# Patient Record
Sex: FEMALE | Race: WHITE | Employment: OTHER | URBAN - METROPOLITAN AREA
[De-identification: names, ages, dates, MRNs, and addresses within clinical notes are randomized per-mention and may not be internally consistent; named-entity substitution may affect disease eponyms.]

---

## 2018-02-22 ENCOUNTER — HOSPITAL ENCOUNTER (EMERGENCY)
Age: 68
Discharge: HOME | End: 2018-02-22
Attending: EMERGENCY MEDICINE
Payer: MEDICARE

## 2018-02-22 VITALS
TEMPERATURE: 97.5 F | DIASTOLIC BLOOD PRESSURE: 87 MMHG | BODY MASS INDEX: 23.32 KG/M2 | HEIGHT: 65 IN | WEIGHT: 140 LBS | HEART RATE: 73 BPM | RESPIRATION RATE: 16 BRPM | SYSTOLIC BLOOD PRESSURE: 151 MMHG | OXYGEN SATURATION: 97 %

## 2018-02-22 DIAGNOSIS — E86.0 DEHYDRATION: Primary | ICD-10-CM

## 2018-02-22 DIAGNOSIS — J11.1 FLU: ICD-10-CM

## 2018-02-22 LAB
ALBUMIN SERPL-MCNC: 4.3 G/DL (ref 3.9–4.9)
ALP BLD-CCNC: 167 U/L (ref 40–130)
ALT SERPL-CCNC: 32 U/L (ref 0–33)
AMYLASE: 37 U/L (ref 28–100)
ANION GAP SERPL CALCULATED.3IONS-SCNC: 21 MEQ/L (ref 7–13)
AST SERPL-CCNC: 50 U/L (ref 0–35)
BASOPHILS ABSOLUTE: 0 K/UL (ref 0–0.2)
BASOPHILS RELATIVE PERCENT: 0.8 %
BILIRUB SERPL-MCNC: 0.2 MG/DL (ref 0–1.2)
BUN BLDV-MCNC: 10 MG/DL (ref 8–23)
CALCIUM SERPL-MCNC: 9.3 MG/DL (ref 8.6–10.2)
CHLORIDE BLD-SCNC: 97 MEQ/L (ref 98–107)
CO2: 21 MEQ/L (ref 22–29)
CREAT SERPL-MCNC: 0.46 MG/DL (ref 0.5–0.9)
EOSINOPHILS ABSOLUTE: 0 K/UL (ref 0–0.7)
EOSINOPHILS RELATIVE PERCENT: 0.2 %
GFR AFRICAN AMERICAN: >60
GFR NON-AFRICAN AMERICAN: >60
GLOBULIN: 2.8 G/DL (ref 2.3–3.5)
GLUCOSE BLD-MCNC: 155 MG/DL (ref 74–109)
HCT VFR BLD CALC: 41.2 % (ref 37–47)
HEMOGLOBIN: 14.2 G/DL (ref 12–16)
LIPASE: 16 U/L (ref 13–60)
LYMPHOCYTES ABSOLUTE: 0.5 K/UL (ref 1–4.8)
LYMPHOCYTES RELATIVE PERCENT: 19.1 %
MCH RBC QN AUTO: 33.7 PG (ref 27–31.3)
MCHC RBC AUTO-ENTMCNC: 34.4 % (ref 33–37)
MCV RBC AUTO: 98.1 FL (ref 82–100)
MONOCYTES ABSOLUTE: 0.3 K/UL (ref 0.2–0.8)
MONOCYTES RELATIVE PERCENT: 9.5 %
NEUTROPHILS ABSOLUTE: 2 K/UL (ref 1.4–6.5)
NEUTROPHILS RELATIVE PERCENT: 70.4 %
PDW BLD-RTO: 12.7 % (ref 11.5–14.5)
PLATELET # BLD: 175 K/UL (ref 130–400)
POTASSIUM SERPL-SCNC: 3.8 MEQ/L (ref 3.5–5.1)
RAPID INFLUENZA  B AGN: POSITIVE
RAPID INFLUENZA A AGN: NEGATIVE
RBC # BLD: 4.2 M/UL (ref 4.2–5.4)
SODIUM BLD-SCNC: 139 MEQ/L (ref 132–144)
TOTAL PROTEIN: 7.1 G/DL (ref 6.4–8.1)
WBC # BLD: 2.9 K/UL (ref 4.8–10.8)

## 2018-02-22 PROCEDURE — 96374 THER/PROPH/DIAG INJ IV PUSH: CPT

## 2018-02-22 PROCEDURE — 99283 EMERGENCY DEPT VISIT LOW MDM: CPT

## 2018-02-22 PROCEDURE — 80053 COMPREHEN METABOLIC PANEL: CPT

## 2018-02-22 PROCEDURE — 6360000002 HC RX W HCPCS: Performed by: EMERGENCY MEDICINE

## 2018-02-22 PROCEDURE — 86403 PARTICLE AGGLUT ANTBDY SCRN: CPT

## 2018-02-22 PROCEDURE — 85025 COMPLETE CBC W/AUTO DIFF WBC: CPT

## 2018-02-22 PROCEDURE — 83690 ASSAY OF LIPASE: CPT

## 2018-02-22 PROCEDURE — 96361 HYDRATE IV INFUSION ADD-ON: CPT

## 2018-02-22 PROCEDURE — 2580000003 HC RX 258: Performed by: EMERGENCY MEDICINE

## 2018-02-22 PROCEDURE — 82150 ASSAY OF AMYLASE: CPT

## 2018-02-22 PROCEDURE — 36415 COLL VENOUS BLD VENIPUNCTURE: CPT

## 2018-02-22 RX ORDER — DIPHENHYDRAMINE HYDROCHLORIDE 50 MG/ML
25 INJECTION INTRAMUSCULAR; INTRAVENOUS ONCE
Status: COMPLETED | OUTPATIENT
Start: 2018-02-22 | End: 2018-02-22

## 2018-02-22 RX ORDER — SODIUM CHLORIDE 0.9 % (FLUSH) 0.9 %
3 SYRINGE (ML) INJECTION EVERY 8 HOURS
Status: DISCONTINUED | OUTPATIENT
Start: 2018-02-22 | End: 2018-02-22 | Stop reason: HOSPADM

## 2018-02-22 RX ORDER — METOPROLOL TARTRATE 50 MG/1
50 TABLET, FILM COATED ORAL 2 TIMES DAILY
COMMUNITY

## 2018-02-22 RX ORDER — 0.9 % SODIUM CHLORIDE 0.9 %
1000 INTRAVENOUS SOLUTION INTRAVENOUS ONCE
Status: COMPLETED | OUTPATIENT
Start: 2018-02-22 | End: 2018-02-22

## 2018-02-22 RX ORDER — METOCLOPRAMIDE HYDROCHLORIDE 5 MG/ML
10 INJECTION INTRAMUSCULAR; INTRAVENOUS ONCE
Status: COMPLETED | OUTPATIENT
Start: 2018-02-22 | End: 2018-02-22

## 2018-02-22 RX ADMIN — SODIUM CHLORIDE 1000 ML: 9 INJECTION, SOLUTION INTRAVENOUS at 00:46

## 2018-02-22 RX ADMIN — DIPHENHYDRAMINE HYDROCHLORIDE 25 MG: 50 INJECTION INTRAMUSCULAR; INTRAVENOUS at 00:47

## 2018-02-22 RX ADMIN — METOCLOPRAMIDE 10 MG: 5 INJECTION, SOLUTION INTRAMUSCULAR; INTRAVENOUS at 00:47

## 2018-02-22 RX ADMIN — Medication 3 ML: at 00:49

## 2018-02-22 ASSESSMENT — ENCOUNTER SYMPTOMS
CONSTIPATION: 0
CHEST TIGHTNESS: 0
NAUSEA: 1
DIARRHEA: 0
SHORTNESS OF BREATH: 0
WHEEZING: 0
BLOOD IN STOOL: 0
BACK PAIN: 0
SINUS PRESSURE: 0
TROUBLE SWALLOWING: 0
VOICE CHANGE: 0
EYE PAIN: 0
COUGH: 0
FACIAL SWELLING: 0
ABDOMINAL PAIN: 0
STRIDOR: 0
CHOKING: 0
EYE DISCHARGE: 0
SORE THROAT: 0
VOMITING: 1
EYE REDNESS: 0

## 2018-02-22 NOTE — ED PROVIDER NOTES
and neck stiffness. Skin: Negative for pallor and rash. Neurological: Positive for weakness. Negative for tremors, seizures, syncope, numbness and headaches. Hematological: Negative for adenopathy. Does not bruise/bleed easily. Psychiatric/Behavioral: Negative for agitation, behavioral problems, hallucinations and sleep disturbance. The patient is not hyperactive. All other systems reviewed and are negative. Except as noted above the remainder of the review of systems was reviewed and negative. PAST MEDICAL HISTORY     Past Medical History:   Diagnosis Date    Breast cancer ([address]     Hypertension          SURGICAL HISTORY       Past Surgical History:   Procedure Laterality Date    CHOLECYSTECTOMY      GASTRECTOMY      MASTECTOMY Bilateral          CURRENT MEDICATIONS       Previous Medications    METOPROLOL TARTRATE (LOPRESSOR) 50 MG TABLET    Take 50 mg by mouth 2 times daily       ALLERGIES     Hctz [hydrochlorothiazide]; Iv dye [iodides]; Naproxen; Pcn [penicillins]; Prednisone; and Sulfa antibiotics    FAMILY HISTORY     History reviewed. No pertinent family history. SOCIAL HISTORY       Social History     Social History    Marital status: Single     Spouse name: N/A    Number of children: N/A    Years of education: N/A     Social History Main Topics    Smoking status: Never Smoker    Smokeless tobacco: Never Used    Alcohol use No    Drug use: No    Sexual activity: Not Asked     Other Topics Concern    None     Social History Narrative    None       SCREENINGS             PHYSICAL EXAM    (up to 7 for level 4, 8 or more for level 5)     ED Triage Vitals [02/22/18 0006]   BP Temp Temp Source Pulse Resp SpO2 Height Weight   (!) 187/114 -- Oral 74 18 98 % -- --       Physical Exam   Constitutional: She is oriented to person, place, and time. She appears well-developed and well-nourished. Tablet in vitro   HENT:   Head: Normocephalic and atraumatic.    Eyes: EOM are

## 2022-06-03 ENCOUNTER — TELEPHONE (OUTPATIENT)
Dept: PHARMACY | Facility: CLINIC | Age: 72
End: 2022-06-03

## 2022-06-03 NOTE — TELEPHONE ENCOUNTER
[facility] HEALTH CLINICAL PHARMACY: STATIN THERAPY REVIEW  Identified statin use in persons with diabetes care gap per [employer]. Records dated: 5/15/22. Last Office Visit: 4/13/22 with cardiology, 11/1/21 with Family Medicine  - Note: appears to follow with [facility] (not [facility] providers ([employer] portal lists Sandra Burrows - per this EMR, appears has followed with [facility] family medicine since at least 2017?)    Patient not found in Outcomes MTM    ASSESSMENT  DIABETES ADHERENCE  *NOTE: Matthews Monk rx for HF, NOT DM    Insurance Records claims through 5/15/22 (YTD Jean Prieto = 98%; Potential Fail Date: 9/23/22):   Jardiance 10mg last filled on 4/20/22 for 90 day supply. Next refill due: 7/19/22    Per CareEverywhere summary, last ordered 10/18/21 for #90, 3 refills (should have refill remaining)    No results found for: LABA1C    STATIN GAP IDENTIFIED    Per chart review, patient is prescribed Jardiance for HF, no DM dx    Per Care Everywhere:  - 7/20/21 Lipid Panel         HDL 45     - 3/18/22 AST/ALT 22/14  - Glucose   4/28/22 94   3/18/22 176   1/13/22 100   12/17/21 120   12/4/21 120   11/19/21 119    The 10-year ASCVD Risk score: 13.6% (intermediate risk)  Sex: Female  Race: White  Values Current  Age: 74  Total Cholesterol (mg/dL) 212  HDL Cholesterol (mg/dL) 45  LDL Cholesterol (mg/dL) 392  Systolic Blood Pressure (mm Hg) 600  Diastolic Blood Pressure (mm Hg) 66  Diabetes: No  Smoker: Never  Treatment for Hypertension: Yes    PLAN  Will not outreach at this time - per 4/13/22 cardiology visit note: \"Mild CAD noted on recent left heart catheterization. We will discuss other cardiac medications including: Statin medication. Lipids should be monitored yearly. Attempt to optimize above heart failure therapy prior to adding additional medications. ... Currently not on statin medication. Goal LDL is less than 70 mg/dL. We will discuss starting a statin medication at next visit. \"    Next cardiology appt appears to be scheduled 10/17/22.     Aurora OneilD, [facility]  Department, toll free: [phone], option 1     =======================================================   For Pharmacy Admin Tracking Only     Gap Closed?: No    Time Spent (min): 15

## 2022-10-17 ENCOUNTER — TELEPHONE (OUTPATIENT)
Dept: PHARMACY | Facility: CLINIC | Age: 72
End: 2022-10-17

## 2022-10-17 NOTE — TELEPHONE ENCOUNTER
[facility] CLINICAL PHARMACY: STATIN THERAPY REVIEW  Identified statin use in persons with cardiovascular disease care gap per [employer]. Records dated 10/8/22. ASSESSMENT:  Patient has been identified as having a diagnosis for clinical ASCVD or event (e.g., inpatient hospitalization for MI, CABG, PCI or other revascularization procedures) in the measurement year and not currently filling a moderate or high intensity statin. Patients included in this care gap are males age 18-72 and females age 43-69. Per chart review, per 4/13/22 cardiology visit note: \"Mild CAD noted on recent left heart catheterization. We will discuss other cardiac medications including: Statin medication. Lipids should be monitored yearly. Attempt to optimize above heart failure therapy prior to adding additional medications. ... Currently not on statin medication. Goal LDL is less than 70 mg/dL. We will discuss starting a statin medication at next visit. \"     Next cardiology appt appears to be scheduled 10/17/22. No results found for: CHOL, TRIG, HDL, LDLCHOLESTEROL, LDLCALC, LDLDIRECT  ALT   Date Value Ref Range Status   02/22/2018 32 0 - 33 U/L Final     AST   Date Value Ref Range Status   02/22/2018 50 (H) 0 - 35 U/L Final       The ASCVD Risk score (Melcher Dallas DK, et al., 2019) failed to calculate for the following reasons: The systolic blood pressure is missing    Cannot find a previous HDL lab    Cannot find a previous total cholesterol lab     PLAN:  Will fax [address] note to cardiology. Orlin Sinha MD  Cardiology  Fax: [phone]    No future appointments. Deisy Valles, AuroraD, [facility] [place]   Department, toll free: [phone], option 1    For Pharmacy Admin Tracking Only  Note does not appear statin started at 10/17/22 visit.   CPA in place:  No  Recommendation Provided To: Provider: 1 via Fax sent to office  Intervention Detail: New Rx: 1, reason: Needs Additional Therapy  Gap Closed?: No   Intervention Accepted By: Provider: 0  Time Spent (min): 15

## 2022-10-17 NOTE — LETTER
Using a Statin for Your Patient with Cardiovascular Disease    Date: 10/17/22    Dear Dr. Brittney Wellington MD  Fax: [phone]    Concerning patient: Enid Clemente  :  1950    It is strongly recommended that your patient with cardiovascular disease be on a moderate or high-intensity statin. If appropriate, please consider adding a statin medication to your patient's therapy. Statin therapy for secondary prevention in patients with clinical arthrosclerotic cardiovascular disease (ASCVD) is recommended by the [address] [place] [address] St of Cardiology and American Heart Association (ACC/AHA). These guidelines are based on numerous trials that show moderate to high-intensity statins reduce additional ASCVD events and disease progression. The Centers for Medicare & Medicaid Services (CMS) also supports this recommendation. Their \"Statin Use in Persons with Cardiovascular Disease\" measure as part of the overall STAR rating. Our team of clinical pharmacists is working with the patient's health plan to assess the gap in therapy. ACC/AHA guidelines call for a high-intensity statin for patients with clinical ASCVD. Moderate-intensity statin therapy may be considered if the patient has drug-drug interactions or a history of statin intolerance. These recommendations are regardless of the baseline lipid levels. If you agree, send a prescription to your patients pharmacy. If you do not agree, please return response to fax number below for our records. Please also feel free to contact me at the number below with any questions or concerns, or if you would like me to further discuss with your patient. Thank you for your help and consideration in caring for this patient.     Respectfully,    [address]  Phone: [phone] // Fax: [phone]

## 2023-04-20 ENCOUNTER — HOSPITAL ENCOUNTER (EMERGENCY)
Age: 73
Discharge: HOME | End: 2023-04-20
Payer: OTHER GOVERNMENT

## 2023-04-20 ENCOUNTER — APPOINTMENT (OUTPATIENT)
Dept: GENERAL RADIOLOGY | Age: 73
End: 2023-04-20
Payer: OTHER GOVERNMENT

## 2023-04-20 VITALS
BODY MASS INDEX: 20.83 KG/M2 | DIASTOLIC BLOOD PRESSURE: 65 MMHG | OXYGEN SATURATION: 99 % | SYSTOLIC BLOOD PRESSURE: 107 MMHG | RESPIRATION RATE: 18 BRPM | TEMPERATURE: 98.3 F | HEART RATE: 80 BPM | WEIGHT: 125 LBS | HEIGHT: 65 IN

## 2023-04-20 DIAGNOSIS — S62.102B OPEN FRACTURE OF LEFT WRIST, INITIAL ENCOUNTER: ICD-10-CM
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
## 2023-04-20 PROCEDURE — 73110 X-RAY EXAM OF WRIST: CPT

## 2023-04-20 PROCEDURE — 73100 X-RAY EXAM OF WRIST: CPT

## 2023-04-20 PROCEDURE — 99285 EMERGENCY DEPT VISIT HI MDM: CPT

## 2023-04-20 PROCEDURE — 12002 RPR S/N/AX/GEN/TRNK2.6-7.5CM: CPT

## 2023-04-20 PROCEDURE — 6370000000 HC RX 637 (ALT 250 FOR IP): Performed by: PHYSICIAN ASSISTANT

## 2023-04-20 PROCEDURE — 73130 X-RAY EXAM OF HAND: CPT

## 2023-04-20 RX ORDER — BACITRACIN, NEOMYCIN, POLYMYXIN B 400; 3.5; 5 [USP'U]/G; MG/G; [USP'U]/G
OINTMENT TOPICAL ONCE
Status: COMPLETED | OUTPATIENT
Start: 2023-04-20 | End: 2023-04-20

## 2023-04-20 RX ORDER — CEFAZOLIN SODIUM IN 0.9 % NACL 2 G/100 ML
2000 PLASTIC BAG, INJECTION (ML) INTRAVENOUS ONCE
Status: DISCONTINUED | OUTPATIENT
Start: 2023-04-20 | End: 2023-04-20

## 2023-04-20 RX ORDER — HYDROCODONE BITARTRATE AND ACETAMINOPHEN 5; 325 MG/1; MG/1
1 TABLET ORAL EVERY 6 HOURS PRN
Qty: 12 TABLET | Refills: 0 | Status: SHIPPED | OUTPATIENT
Start: 2023-04-20 | End: 2023-04-23

## 2023-04-20 RX ORDER — CIPROFLOXACIN 500 MG/1
500 TABLET, FILM COATED ORAL ONCE
Status: COMPLETED | OUTPATIENT
Start: 2023-04-20 | End: 2023-04-20

## 2023-04-20 RX ORDER — CLINDAMYCIN HYDROCHLORIDE 300 MG/1
300 CAPSULE ORAL 2 TIMES DAILY
Qty: 14 CAPSULE | Refills: 0 | Status: SHIPPED | OUTPATIENT
Start: 2023-04-20 | End: 2023-04-27

## 2023-04-20 RX ORDER — CIPROFLOXACIN 500 MG/1
500 TABLET, FILM COATED ORAL 2 TIMES DAILY
Qty: 14 TABLET | Refills: 0 | Status: SHIPPED | OUTPATIENT
Start: 2023-04-20 | End: 2023-04-27

## 2023-04-20 RX ORDER — CLINDAMYCIN HYDROCHLORIDE 150 MG/1
300 CAPSULE ORAL ONCE
Status: COMPLETED | OUTPATIENT
Start: 2023-04-20 | End: 2023-04-20

## 2023-04-20 RX ADMIN — CLINDAMYCIN HYDROCHLORIDE 300 MG: 150 CAPSULE ORAL at 08:47

## 2023-04-20 RX ADMIN — CIPROFLOXACIN 500 MG: 500 TABLET, FILM COATED ORAL at 08:47

## 2023-04-20 RX ADMIN — BACITRACIN, NEOMYCIN, POLYMYXIN B: 400; 3.5; 5 OINTMENT TOPICAL at 08:48

## 2023-04-20 ASSESSMENT — LIFESTYLE VARIABLES
HOW MANY STANDARD DRINKS CONTAINING ALCOHOL DO YOU HAVE ON A TYPICAL DAY: PATIENT DOES NOT DRINK
HOW MANY STANDARD DRINKS CONTAINING ALCOHOL DO YOU HAVE ON A TYPICAL DAY: PATIENT DOES NOT DRINK
HOW OFTEN DO YOU HAVE A DRINK CONTAINING ALCOHOL: NEVER
HOW OFTEN DO YOU HAVE A DRINK CONTAINING ALCOHOL: NEVER

## 2023-04-20 ASSESSMENT — PAIN DESCRIPTION - ORIENTATION: ORIENTATION: LEFT

## 2023-04-20 ASSESSMENT — PAIN DESCRIPTION - ONSET: ONSET: ON-GOING

## 2023-04-20 ASSESSMENT — ENCOUNTER SYMPTOMS
EYE DISCHARGE: 0
SORE THROAT: 0
COLOR CHANGE: 0
ABDOMINAL DISTENTION: 0
RHINORRHEA: 0
CONSTIPATION: 0
ABDOMINAL PAIN: 0
SHORTNESS OF BREATH: 0

## 2023-04-20 ASSESSMENT — PAIN DESCRIPTION - FREQUENCY: FREQUENCY: INTERMITTENT

## 2023-04-20 ASSESSMENT — PAIN - FUNCTIONAL ASSESSMENT: PAIN_FUNCTIONAL_ASSESSMENT: 0-10

## 2023-04-20 ASSESSMENT — PAIN DESCRIPTION - PAIN TYPE: TYPE: ACUTE PAIN

## 2023-04-20 ASSESSMENT — PAIN SCALES - GENERAL: PAINLEVEL_OUTOF10: 8

## 2023-04-20 ASSESSMENT — PAIN DESCRIPTION - DESCRIPTORS: DESCRIPTORS: ACHING

## 2023-04-20 ASSESSMENT — PAIN DESCRIPTION - LOCATION: LOCATION: HAND

## 2023-04-20 NOTE — ED NOTES
Wound care completed.  Vaseline gauze, ATB ointment, nonadherent pads, and kerlex to secure      Keanu Reddy RN  04/20/23 [id number]

## 2023-04-20 NOTE — ED TRIAGE NOTES
Patient arrived from home via family after being bit by her pit bull. Patient A&OX4. Dog bite to left hand.  Wrapped in towel upon arrival.

## 2023-04-20 NOTE — ED PROVIDER NOTES
location:  L palm    Length (cm):  3    Depth (mm):  3  Pre-procedure details:     Preparation:  Patient was prepped and draped in usual sterile fashion  Exploration:     Imaging obtained: x-ray      Imaging outcome: foreign body not noted      Wound exploration: wound explored through full range of motion      Wound extent: underlying fracture      Wound extent: no foreign bodies/material noted, no muscle damage noted and no nerve damage noted      Contaminated: yes    Treatment:     Area cleansed with:  Povidone-iodine    Amount of cleaning:  Extensive    Irrigation solution:  Sterile water    Irrigation volume:  100    Irrigation method:  Syringe    Visualized foreign bodies/material removed: no      Debridement:  None    Undermining:  None  Skin repair:     Repair method:  Sutures    Suture size:  4-0    Suture material:  Nylon    Suture technique:  Simple interrupted    Number of sutures:  5  Approximation:     Approximation:  Loose  Repair type:     Repair type:  Simple  Post-procedure details:     Dressing:  Antibiotic ointment and non-adherent dressing    Procedure completion:  Tolerated well, no immediate complications    FINAL IMPRESSION    No diagnosis found. DISPOSITION/PLAN   DISPOSITION        PATIENT REFERRED TO:  No follow-up provider specified.     DISCHARGE MEDICATIONS:  New Prescriptions    No medications on file          (Please note that portions of this note were completed with a voice recognition program.  Efforts were made to edit the dictations but occasionally words are mis-transcribed.)    Charles Andres PA-C (electronically signed)  Attending Emergency Physician         Charles Andres PA-C  04/21/23 [id number]